# Patient Record
Sex: MALE | Race: OTHER | HISPANIC OR LATINO | URBAN - METROPOLITAN AREA
[De-identification: names, ages, dates, MRNs, and addresses within clinical notes are randomized per-mention and may not be internally consistent; named-entity substitution may affect disease eponyms.]

---

## 2021-03-11 ENCOUNTER — EMERGENCY (EMERGENCY)
Facility: HOSPITAL | Age: 51
LOS: 1 days | Discharge: HOME | End: 2021-03-11
Attending: STUDENT IN AN ORGANIZED HEALTH CARE EDUCATION/TRAINING PROGRAM
Payer: MEDICAID

## 2021-03-11 VITALS
DIASTOLIC BLOOD PRESSURE: 86 MMHG | HEART RATE: 92 BPM | WEIGHT: 179.9 LBS | OXYGEN SATURATION: 97 % | SYSTOLIC BLOOD PRESSURE: 150 MMHG | HEIGHT: 64 IN | RESPIRATION RATE: 18 BRPM | TEMPERATURE: 98 F

## 2021-03-11 LAB
ALBUMIN SERPL ELPH-MCNC: 4 G/DL — SIGNIFICANT CHANGE UP (ref 3.5–5)
ALP SERPL-CCNC: 109 U/L — SIGNIFICANT CHANGE UP (ref 40–120)
ALT FLD-CCNC: 35 U/L DA — SIGNIFICANT CHANGE UP (ref 10–60)
ANION GAP SERPL CALC-SCNC: 5 MMOL/L — SIGNIFICANT CHANGE UP (ref 5–17)
APPEARANCE UR: CLEAR — SIGNIFICANT CHANGE UP
AST SERPL-CCNC: 16 U/L — SIGNIFICANT CHANGE UP (ref 10–40)
BACTERIA # UR AUTO: ABNORMAL /HPF
BASOPHILS # BLD AUTO: 0.05 K/UL — SIGNIFICANT CHANGE UP (ref 0–0.2)
BASOPHILS NFR BLD AUTO: 0.3 % — SIGNIFICANT CHANGE UP (ref 0–2)
BILIRUB SERPL-MCNC: 0.6 MG/DL — SIGNIFICANT CHANGE UP (ref 0.2–1.2)
BILIRUB UR-MCNC: NEGATIVE — SIGNIFICANT CHANGE UP
BUN SERPL-MCNC: 19 MG/DL — HIGH (ref 7–18)
CALCIUM SERPL-MCNC: 9.1 MG/DL — SIGNIFICANT CHANGE UP (ref 8.4–10.5)
CHLORIDE SERPL-SCNC: 108 MMOL/L — SIGNIFICANT CHANGE UP (ref 96–108)
CO2 SERPL-SCNC: 27 MMOL/L — SIGNIFICANT CHANGE UP (ref 22–31)
COLOR SPEC: YELLOW — SIGNIFICANT CHANGE UP
CREAT SERPL-MCNC: 1.04 MG/DL — SIGNIFICANT CHANGE UP (ref 0.5–1.3)
DIFF PNL FLD: ABNORMAL
EOSINOPHIL # BLD AUTO: 0.22 K/UL — SIGNIFICANT CHANGE UP (ref 0–0.5)
EOSINOPHIL NFR BLD AUTO: 1.2 % — SIGNIFICANT CHANGE UP (ref 0–6)
GLUCOSE SERPL-MCNC: 125 MG/DL — HIGH (ref 70–99)
GLUCOSE UR QL: NEGATIVE — SIGNIFICANT CHANGE UP
HCT VFR BLD CALC: 44.4 % — SIGNIFICANT CHANGE UP (ref 39–50)
HGB BLD-MCNC: 14.6 G/DL — SIGNIFICANT CHANGE UP (ref 13–17)
IMM GRANULOCYTES NFR BLD AUTO: 0.6 % — SIGNIFICANT CHANGE UP (ref 0–1.5)
KETONES UR-MCNC: ABNORMAL
LEUKOCYTE ESTERASE UR-ACNC: ABNORMAL
LIDOCAIN IGE QN: 119 U/L — SIGNIFICANT CHANGE UP (ref 73–393)
LYMPHOCYTES # BLD AUTO: 11.5 % — LOW (ref 13–44)
LYMPHOCYTES # BLD AUTO: 2.08 K/UL — SIGNIFICANT CHANGE UP (ref 1–3.3)
MAGNESIUM SERPL-MCNC: 2.1 MG/DL — SIGNIFICANT CHANGE UP (ref 1.6–2.6)
MCHC RBC-ENTMCNC: 29.6 PG — SIGNIFICANT CHANGE UP (ref 27–34)
MCHC RBC-ENTMCNC: 32.9 GM/DL — SIGNIFICANT CHANGE UP (ref 32–36)
MCV RBC AUTO: 89.9 FL — SIGNIFICANT CHANGE UP (ref 80–100)
MONOCYTES # BLD AUTO: 1.24 K/UL — HIGH (ref 0–0.9)
MONOCYTES NFR BLD AUTO: 6.9 % — SIGNIFICANT CHANGE UP (ref 2–14)
NEUTROPHILS # BLD AUTO: 14.34 K/UL — HIGH (ref 1.8–7.4)
NEUTROPHILS NFR BLD AUTO: 79.5 % — HIGH (ref 43–77)
NITRITE UR-MCNC: NEGATIVE — SIGNIFICANT CHANGE UP
NRBC # BLD: 0 /100 WBCS — SIGNIFICANT CHANGE UP (ref 0–0)
PH UR: 5 — SIGNIFICANT CHANGE UP (ref 5–8)
PLATELET # BLD AUTO: 240 K/UL — SIGNIFICANT CHANGE UP (ref 150–400)
POTASSIUM SERPL-MCNC: 4 MMOL/L — SIGNIFICANT CHANGE UP (ref 3.5–5.3)
POTASSIUM SERPL-SCNC: 4 MMOL/L — SIGNIFICANT CHANGE UP (ref 3.5–5.3)
PROT SERPL-MCNC: 7.8 G/DL — SIGNIFICANT CHANGE UP (ref 6–8.3)
PROT UR-MCNC: 15
RBC # BLD: 4.94 M/UL — SIGNIFICANT CHANGE UP (ref 4.2–5.8)
RBC # FLD: 13.1 % — SIGNIFICANT CHANGE UP (ref 10.3–14.5)
RBC CASTS # UR COMP ASSIST: SIGNIFICANT CHANGE UP /HPF (ref 0–2)
SODIUM SERPL-SCNC: 140 MMOL/L — SIGNIFICANT CHANGE UP (ref 135–145)
SP GR SPEC: 1.02 — SIGNIFICANT CHANGE UP (ref 1.01–1.02)
TSH SERPL-MCNC: 0.8 UU/ML — SIGNIFICANT CHANGE UP (ref 0.34–4.82)
UROBILINOGEN FLD QL: NEGATIVE — SIGNIFICANT CHANGE UP
WBC # BLD: 18.03 K/UL — HIGH (ref 3.8–10.5)
WBC # FLD AUTO: 18.03 K/UL — HIGH (ref 3.8–10.5)
WBC UR QL: SIGNIFICANT CHANGE UP /HPF (ref 0–5)

## 2021-03-11 PROCEDURE — 99285 EMERGENCY DEPT VISIT HI MDM: CPT

## 2021-03-11 RX ORDER — KETOROLAC TROMETHAMINE 30 MG/ML
30 SYRINGE (ML) INJECTION ONCE
Refills: 0 | Status: DISCONTINUED | OUTPATIENT
Start: 2021-03-11 | End: 2021-03-11

## 2021-03-11 RX ORDER — CEFEPIME 1 G/1
1000 INJECTION, POWDER, FOR SOLUTION INTRAMUSCULAR; INTRAVENOUS ONCE
Refills: 0 | Status: COMPLETED | OUTPATIENT
Start: 2021-03-11 | End: 2021-03-11

## 2021-03-11 RX ORDER — SODIUM CHLORIDE 9 MG/ML
1000 INJECTION INTRAMUSCULAR; INTRAVENOUS; SUBCUTANEOUS ONCE
Refills: 0 | Status: COMPLETED | OUTPATIENT
Start: 2021-03-11 | End: 2021-03-11

## 2021-03-11 RX ADMIN — Medication 30 MILLIGRAM(S): at 22:30

## 2021-03-11 RX ADMIN — SODIUM CHLORIDE 1000 MILLILITER(S): 9 INJECTION INTRAMUSCULAR; INTRAVENOUS; SUBCUTANEOUS at 22:13

## 2021-03-11 RX ADMIN — Medication 30 MILLIGRAM(S): at 22:13

## 2021-03-11 NOTE — ED PROVIDER NOTE - NSFOLLOWUPINSTRUCTIONS_ED_ALL_ED_FT
Hoy te vieron en la nathalie de emergencias por dolor de estómago.    Llame al consultorio del médico de atención primaria para informarles de esta visita a la nathalie de emergencias y obtener la próxima lashnoda disponible dentro de los próximos 5 días. [place] comentamos, regrese a la nathalie de emergencias si tiene algún síntoma que empeora.    Ya no creemos que necesite más atención de emergencia o admisión al hospital en kat momento.    Si genesis hemos determinado que actualmente está estable para el caitlin, sabemos que las cosas pueden cambiar.   Busque atención médica inmediata o regrese a la nathalie de emergencias si experimenta alguno de los siguientes:   Cualquier síntoma que empeore o persista   Dolor severo Dolor de pecho   Respiración dificultosa   Sangrado   Desmayarse   Erupción severa   Incapacidad para comer o beber   Fiebre persistente      Consulte a un médico de atención primaria o un especialista en un plazo de 5 días para asegurarse de que está mejorando.      Llame a los números de teléfono de [facility] que figuran en kat documento si tiene algún problema para obtener sobia lashonda de seguimiento.    ¡Te deseo lo mejor! -Dr Baca

## 2021-03-11 NOTE — ED PROVIDER NOTE - NSFOLLOWUPCLINICS_GEN_ALL_ED_FT
[facility]  Internal Medicine  [address] [place] [address]  Phone: [phone]  Fax: [phone]  Follow Up Time: 4-6 Days

## 2021-03-11 NOTE — ED PROVIDER NOTE - CLINICAL SUMMARY MEDICAL DECISION MAKING FREE TEXT BOX
50M presenting with abdominal pain. no fever, vomiting, dysuria or hematuria. concern for kidney stone, appendicitis, colitis. will get labs, CT abdomen, symptom control. will reassess. 50M presenting with abdominal pain. no fever, vomiting, dysuria or hematuria. concern for kidney stone, appendicitis, colitis. will get labs, CT abdomen, symptom control. will reassess.    Daniel 0359:  Pt s/o from Dr Fritz pending CT scan. CT wnl. On reassessment pt states that his symptoms and pain have completely resolved and he feels well. Pt needs PMD referral. Discussed with pt my clinical impression and results, patient given strict return precautions if persistent or worsening of symptoms occurs, and need for close follow up. Pt expressed understanding and agrees with plan. Pt is well appearing with a reassuring exam. Discharge home with PMD or Specialist f/u within 5 days.

## 2021-03-11 NOTE — ED PROVIDER NOTE - OBJECTIVE STATEMENT
50M, no significant pmh, presenting with abdominal pain. patient abdominal pain starting yesterday. has right flank pain that is wrapping around to his stomach. has nausea but no vomiting, pain or burning with urination or blood in urine. no chest pain, shortness of breath or fever. no similar symptoms in the past.

## 2021-03-11 NOTE — ED PROVIDER NOTE - PHYSICAL EXAMINATION
General: well appearing male, no acute distress   HEENT: normocephalic, atraumatic   Respiratory: normal work of breathing, lungs clear to auscultation bilaterally   Cardiac: regular rate and rhythm   Abdomen: soft, non-tender, no guarding or rebound, no CVA tenderness to palpation    MSK: no swelling or tenderness of lower extremities, moving all extremities spontaneously   Skin: warm, dry   Neuro: A&Ox3  Psych: appropriate affect

## 2021-03-11 NOTE — ED PROVIDER NOTE - PATIENT PORTAL LINK FT
You can access the FollowMyHealth Patient Portal offered by [facility] by registering at the following website: [email or web address]. By joining [employer]’s FollowMyHealth portal, you will also be able to view your health information using other applications (apps) compatible with our system.

## 2021-03-12 VITALS
RESPIRATION RATE: 18 BRPM | HEART RATE: 90 BPM | OXYGEN SATURATION: 97 % | TEMPERATURE: 98 F | SYSTOLIC BLOOD PRESSURE: 140 MMHG | DIASTOLIC BLOOD PRESSURE: 80 MMHG

## 2021-03-12 LAB
CULTURE RESULTS: SIGNIFICANT CHANGE UP
SPECIMEN SOURCE: SIGNIFICANT CHANGE UP

## 2021-03-12 PROCEDURE — 99284 EMERGENCY DEPT VISIT MOD MDM: CPT | Mod: 25

## 2021-03-12 PROCEDURE — 85025 COMPLETE CBC W/AUTO DIFF WBC: CPT

## 2021-03-12 PROCEDURE — 80053 COMPREHEN METABOLIC PANEL: CPT

## 2021-03-12 PROCEDURE — 36415 COLL VENOUS BLD VENIPUNCTURE: CPT

## 2021-03-12 PROCEDURE — 96375 TX/PRO/DX INJ NEW DRUG ADDON: CPT

## 2021-03-12 PROCEDURE — G1004: CPT

## 2021-03-12 PROCEDURE — 74177 CT ABD & PELVIS W/CONTRAST: CPT | Mod: 26,MG

## 2021-03-12 PROCEDURE — 87086 URINE CULTURE/COLONY COUNT: CPT

## 2021-03-12 PROCEDURE — 81001 URINALYSIS AUTO W/SCOPE: CPT

## 2021-03-12 PROCEDURE — 83690 ASSAY OF LIPASE: CPT

## 2021-03-12 PROCEDURE — 84443 ASSAY THYROID STIM HORMONE: CPT

## 2021-03-12 PROCEDURE — 74177 CT ABD & PELVIS W/CONTRAST: CPT

## 2021-03-12 PROCEDURE — 83735 ASSAY OF MAGNESIUM: CPT

## 2021-03-12 PROCEDURE — 96374 THER/PROPH/DIAG INJ IV PUSH: CPT | Mod: XU

## 2021-03-12 RX ADMIN — CEFEPIME 100 MILLIGRAM(S): 1 INJECTION, POWDER, FOR SOLUTION INTRAMUSCULAR; INTRAVENOUS at 01:06

## 2022-01-22 NOTE — ED ADULT NURSE NOTE - PRIMARY CARE PROVIDER
1. TPN via PICC:  225g Dex, 63g AA, 20g SMOF Lipids to provide in total 1217 Kcal, 63g protein, GIR 3.7mg/kg/min, 1.5g/kg IBW protein.   Recommend checking TG before starting TPN and then check TG weekly. Check Mg, Phos, K daily and POC BG Q6hrs. Trend daily weights. Fluids and lytes per MD discretion. Start at 100g Dex on Day 1, 225g Dex on Day 2. 2. Utilize GI tract as medically feasible 3. Pain and bowel regimens per team 4. Keep nutrition aligned with GOC at all times *dw SICU unk 1. TPN via PICC:  225g Dex, 63g AA, 20g SMOF Lipids to provide in total 1217 Kcal, 63g protein, GIR 3.7mg/kg/min, 1.5g/kg IBW protein.   Recommend checking TG before starting TPN and then check TG weekly. Check Mg, Phos, K daily and POC BG Q6hrs. Trend daily weights. Fluids and lytes per MD discretion. Start at 100g Dex on Day 1, 225g Dex on Day 2. *Monitor lytes very closely for s/s refeeding syndrome* 2. Utilize GI tract as medically feasible 3. Pain and bowel regimens per team 4. Keep nutrition aligned with GOC at all times *subha SICU

## 2022-11-12 ENCOUNTER — EMERGENCY (EMERGENCY)
Facility: HOSPITAL | Age: 52
LOS: 1 days | Discharge: HOME | End: 2022-11-12
Attending: EMERGENCY MEDICINE
Payer: MEDICAID

## 2022-11-12 VITALS
SYSTOLIC BLOOD PRESSURE: 160 MMHG | TEMPERATURE: 98 F | HEART RATE: 67 BPM | RESPIRATION RATE: 18 BRPM | WEIGHT: 184.09 LBS | DIASTOLIC BLOOD PRESSURE: 90 MMHG | OXYGEN SATURATION: 98 %

## 2022-11-12 LAB
ALBUMIN SERPL ELPH-MCNC: 3.9 G/DL — SIGNIFICANT CHANGE UP (ref 3.5–5)
ALP SERPL-CCNC: 97 U/L — SIGNIFICANT CHANGE UP (ref 40–120)
ALT FLD-CCNC: 38 U/L DA — SIGNIFICANT CHANGE UP (ref 10–60)
ANION GAP SERPL CALC-SCNC: 4 MMOL/L — LOW (ref 5–17)
AST SERPL-CCNC: 16 U/L — SIGNIFICANT CHANGE UP (ref 10–40)
BASOPHILS # BLD AUTO: 0.04 K/UL — SIGNIFICANT CHANGE UP (ref 0–0.2)
BASOPHILS NFR BLD AUTO: 0.4 % — SIGNIFICANT CHANGE UP (ref 0–2)
BILIRUB SERPL-MCNC: 0.6 MG/DL — SIGNIFICANT CHANGE UP (ref 0.2–1.2)
BUN SERPL-MCNC: 15 MG/DL — SIGNIFICANT CHANGE UP (ref 7–18)
CALCIUM SERPL-MCNC: 8.8 MG/DL — SIGNIFICANT CHANGE UP (ref 8.4–10.5)
CHLORIDE SERPL-SCNC: 108 MMOL/L — SIGNIFICANT CHANGE UP (ref 96–108)
CO2 SERPL-SCNC: 25 MMOL/L — SIGNIFICANT CHANGE UP (ref 22–31)
CREAT SERPL-MCNC: 1.05 MG/DL — SIGNIFICANT CHANGE UP (ref 0.5–1.3)
EGFR: 86 ML/MIN/1.73M2 — SIGNIFICANT CHANGE UP
EOSINOPHIL # BLD AUTO: 0.14 K/UL — SIGNIFICANT CHANGE UP (ref 0–0.5)
EOSINOPHIL NFR BLD AUTO: 1.6 % — SIGNIFICANT CHANGE UP (ref 0–6)
GLUCOSE SERPL-MCNC: 191 MG/DL — HIGH (ref 70–99)
HCT VFR BLD CALC: 48 % — SIGNIFICANT CHANGE UP (ref 39–50)
HGB BLD-MCNC: 15.5 G/DL — SIGNIFICANT CHANGE UP (ref 13–17)
IMM GRANULOCYTES NFR BLD AUTO: 0.6 % — SIGNIFICANT CHANGE UP (ref 0–0.9)
LIDOCAIN IGE QN: 99 U/L — SIGNIFICANT CHANGE UP (ref 73–393)
LYMPHOCYTES # BLD AUTO: 1.16 K/UL — SIGNIFICANT CHANGE UP (ref 1–3.3)
LYMPHOCYTES # BLD AUTO: 13 % — SIGNIFICANT CHANGE UP (ref 13–44)
MCHC RBC-ENTMCNC: 29.1 PG — SIGNIFICANT CHANGE UP (ref 27–34)
MCHC RBC-ENTMCNC: 32.3 GM/DL — SIGNIFICANT CHANGE UP (ref 32–36)
MCV RBC AUTO: 90.2 FL — SIGNIFICANT CHANGE UP (ref 80–100)
MONOCYTES # BLD AUTO: 0.42 K/UL — SIGNIFICANT CHANGE UP (ref 0–0.9)
MONOCYTES NFR BLD AUTO: 4.7 % — SIGNIFICANT CHANGE UP (ref 2–14)
NEUTROPHILS # BLD AUTO: 7.14 K/UL — SIGNIFICANT CHANGE UP (ref 1.8–7.4)
NEUTROPHILS NFR BLD AUTO: 79.7 % — HIGH (ref 43–77)
NRBC # BLD: 0 /100 WBCS — SIGNIFICANT CHANGE UP (ref 0–0)
PLATELET # BLD AUTO: 206 K/UL — SIGNIFICANT CHANGE UP (ref 150–400)
POTASSIUM SERPL-MCNC: 4.2 MMOL/L — SIGNIFICANT CHANGE UP (ref 3.5–5.3)
POTASSIUM SERPL-SCNC: 4.2 MMOL/L — SIGNIFICANT CHANGE UP (ref 3.5–5.3)
PROT SERPL-MCNC: 7.5 G/DL — SIGNIFICANT CHANGE UP (ref 6–8.3)
RBC # BLD: 5.32 M/UL — SIGNIFICANT CHANGE UP (ref 4.2–5.8)
RBC # FLD: 13 % — SIGNIFICANT CHANGE UP (ref 10.3–14.5)
SODIUM SERPL-SCNC: 137 MMOL/L — SIGNIFICANT CHANGE UP (ref 135–145)
TROPONIN I, HIGH SENSITIVITY RESULT: 6.8 NG/L — SIGNIFICANT CHANGE UP
WBC # BLD: 8.95 K/UL — SIGNIFICANT CHANGE UP (ref 3.8–10.5)
WBC # FLD AUTO: 8.95 K/UL — SIGNIFICANT CHANGE UP (ref 3.8–10.5)

## 2022-11-12 PROCEDURE — 99285 EMERGENCY DEPT VISIT HI MDM: CPT

## 2022-11-12 PROCEDURE — 83690 ASSAY OF LIPASE: CPT

## 2022-11-12 PROCEDURE — 36415 COLL VENOUS BLD VENIPUNCTURE: CPT

## 2022-11-12 PROCEDURE — 84484 ASSAY OF TROPONIN QUANT: CPT

## 2022-11-12 PROCEDURE — 80053 COMPREHEN METABOLIC PANEL: CPT

## 2022-11-12 PROCEDURE — 99284 EMERGENCY DEPT VISIT MOD MDM: CPT | Mod: 25

## 2022-11-12 PROCEDURE — 96375 TX/PRO/DX INJ NEW DRUG ADDON: CPT

## 2022-11-12 PROCEDURE — 96374 THER/PROPH/DIAG INJ IV PUSH: CPT

## 2022-11-12 PROCEDURE — 85025 COMPLETE CBC W/AUTO DIFF WBC: CPT

## 2022-11-12 RX ORDER — ONDANSETRON 8 MG/1
4 TABLET, FILM COATED ORAL ONCE
Refills: 0 | Status: COMPLETED | OUTPATIENT
Start: 2022-11-12 | End: 2022-11-12

## 2022-11-12 RX ORDER — MORPHINE SULFATE 50 MG/1
4 CAPSULE, EXTENDED RELEASE ORAL ONCE
Refills: 0 | Status: DISCONTINUED | OUTPATIENT
Start: 2022-11-12 | End: 2022-11-12

## 2022-11-12 RX ORDER — FAMOTIDINE 10 MG/ML
20 INJECTION INTRAVENOUS ONCE
Refills: 0 | Status: COMPLETED | OUTPATIENT
Start: 2022-11-12 | End: 2022-11-12

## 2022-11-12 RX ORDER — LIDOCAINE 4 G/100G
10 CREAM TOPICAL ONCE
Refills: 0 | Status: COMPLETED | OUTPATIENT
Start: 2022-11-12 | End: 2022-11-12

## 2022-11-12 RX ORDER — SODIUM CHLORIDE 9 MG/ML
1000 INJECTION INTRAMUSCULAR; INTRAVENOUS; SUBCUTANEOUS ONCE
Refills: 0 | Status: COMPLETED | OUTPATIENT
Start: 2022-11-12 | End: 2022-11-12

## 2022-11-12 RX ORDER — FAMOTIDINE 10 MG/ML
1 INJECTION INTRAVENOUS
Qty: 28 | Refills: 0
Start: 2022-11-12 | End: 2022-11-25

## 2022-11-12 RX ADMIN — FAMOTIDINE 20 MILLIGRAM(S): 10 INJECTION INTRAVENOUS at 06:25

## 2022-11-12 RX ADMIN — SODIUM CHLORIDE 1000 MILLILITER(S): 9 INJECTION INTRAMUSCULAR; INTRAVENOUS; SUBCUTANEOUS at 06:25

## 2022-11-12 RX ADMIN — ONDANSETRON 4 MILLIGRAM(S): 8 TABLET, FILM COATED ORAL at 06:25

## 2022-11-12 RX ADMIN — Medication 30 MILLILITER(S): at 06:25

## 2022-11-12 RX ADMIN — MORPHINE SULFATE 4 MILLIGRAM(S): 50 CAPSULE, EXTENDED RELEASE ORAL at 06:25

## 2022-11-12 RX ADMIN — LIDOCAINE 10 MILLILITER(S): 4 CREAM TOPICAL at 06:25

## 2022-11-12 NOTE — ED PROVIDER NOTE - NSFOLLOWUPINSTRUCTIONS_ED_ALL_ED_FT
[facility]                                                                                                                                 Gastritis en adultos    Gastritis, Adult    Outline of an adult's lower body with a close-up of the stomach, showing inflammation and an ulcer inside the stomach.    La gastritis es la inflamación del estómago. Hay dos tipos de gastritis:  •Gastritis aguda. Merry tipo aparece de manera repentina.      •Gastritis crónica. Merry tipo es mucho más frecuente. Se desarrolla lentamente y dura un nichole tiempo.      La gastritis se manifiesta cuando el revestimiento del estómago se debilita o se lesiona. Sin tratamiento, la gastritis puede causar sangrado y úlceras estomacales.      ¿Cuáles son las causas?    Esta afección puede ser causada por lo siguiente:  •Sobia infección.      •Beber alcohol en exceso.      •Ciertos medicamentos. Estos incluyen corticoesteroides, antibióticos y algunos medicamentos de venta sin receta, violette aspirina o ibuprofeno.      •Tener demasiado ácido en el estómago.      •Tener sobia enfermedad del estómago.      Algunas otras causas son las siguientes:  •Sobia reacción alérgica.      •Algunos tratamientos para el cáncer (radiación).      •Fumar cigarrillos o usar productos que contengan nicotina o tabaco.      En algunos casos, se desconoce la causa de esta afección.      ¿Qué incrementa el riesgo?    •Tener sobia enfermedad de los intestinos.      •Tener sobia enfermedad por la cual el propio sistema inmunitario ataca el organismo (enfermedad autoinmunitaria), violette la enfermedad de Crohn.      •Usar aspirina o ibuprofeno y otros antiinflamatorios no esteroideos (JAN) para tratar otras afecciones, violette enfermedades cardíacas o dolor crónico.      •Estrés.        ¿Cuáles son los signos o síntomas?    Los síntomas de esta afección incluyen los siguientes:  •Dolor o sensación de ardor en la parte superior del abdomen.      •Náuseas.      •Vómitos.      •Sensación molesta de saciedad después de comer.      •Pérdida de peso.      •Mal aliento.      •Sony en el vómito o en la materia fecal (heces).      En algunos casos, no hay síntomas.      ¿Cómo se diagnostica?    Esta afección se puede diagnosticar en función de patricia antecedentes médicos, un examen físico y pruebas. Las pruebas pueden incluir las siguientes:  •Patricia antecedentes médicos y sobia descripción de patricia síntomas.      •Un examen físico.    •Pruebas. Estas pueden incluir las siguientes:  •Pruebas de sony.      •Pruebas de heces.      •Sobia prueba en la que se introduce un instrumento garcia y flexible con sobia priscilla y sobia pequeña cámara a través del esófago hasta el estómago (endoscopía catilin).      •Sobia prueba en la que se jose sobia muestra de tejido para analizarla con un microscopio (biopsia).          ¿Cómo se trata?    Esta afección se puede tratar con medicamentos. Los medicamentos que se utilizan varían según la causa de la gastritis.  •Si la afección se debe a sobia infección bacteriana, pueden recetarle medicamentos antibióticos.      •Si la afección se debe al exceso de ácido estomacal, se pueden administrar medicamentos denominados bloqueadores H2, inhibidores de la bomba de protones o antiácidos.      El tratamiento también puede incluir interrumpir el uso de ciertos medicamentos violette aspirina o ibuprofeno y otros antiinflamatorios no esteroideos (JAN).      Siga estas instrucciones en claudio casa:    Medicamentos     •Use los medicamentos de venta elen y los recetados solamente violette se lo haya indicado el médico.      •Si le recetaron un antibiótico, tómelo violette se lo haya indicado el médico. No deje de ventura el antibiótico aunque comience a sentirse mejor.      Consumo de alcohol   • No ysabel alcohol si:  •Claudio médico le indica no hacerlo.      •Está embarazada, puede estar embarazada o está tratando de quedar embarazada.      •Si savannah alcohol:•Limite claudio uso a las siguientes medidas:  •De 0 a 1 medida por día para las mujeres.      •De 0 a 2 medidas por día para los hombres.        •Sepa cuánta cantidad de alcohol hay en las bebidas que jose. En los [place], sobia medida equivale a sobia botella de cerveza de 12 oz (355 ml), un vaso de vino de 5 oz (148 ml) o un vaso de sobia bebida alcohólica de caitlin graduación de 1½ oz (44 ml).          Instrucciones generales   A comparison of three sample cups showing dark yellow, yellow, and pale yellow urine.   •Moses comidas pequeñas y frecuentes mica el día en lugar de comidas abundantes.      •Evite los alimentos y las bebidas que intensifican los síntomas.      •Hable con el médico sobre las formas de controlar el estrés, violette realizar ejercicio con regularidad o practicar respiración profunda, meditación o yoga.      • No consuma ningún producto que contenga nicotina o tabaco. Estos productos incluyen cigarrillos, tabaco para mascar y aparatos de vapeo, violette los cigarrillos electrónicos. Si necesita ayuda para dejar de fumar, consulte al médico.      •Ysabel suficiente líquido violette para mantener la orina de color amarillo pálido.      •Concurra a todas las visitas de seguimiento. [place] es importante.        Comuníquese con un médico si:    •Patricia síntomas empeoran.      •El dolor abdominal empeora.      •Los síntomas regresan después del tratamiento.      •Tiene fiebre.        Solicite ayuda de inmediato si:    •Vomita sony o sobia sustancia parecida a los posos del café.      •Las heces son negras o de color whatley oscuro.      •No puede retener los líquidos.      Estos síntomas pueden representar un problema grave que constituye sobia emergencia. No espere a michael si los síntomas desaparecen. Solicite atención médica de inmediato. Comuníquese con el servicio de emergencias de claudio localidad (911 en los [place]). No conduzca por patricia propios medios hasta el hospital.       Resumen    •La gastritis es la inflamación del revestimiento del estómago que puede ocurrir de manera repentina (aguda) o desarrollarse lentamente con el tiempo (crónica).      •Esta afección se diagnostica mediante los antecedentes médicos, un examen físico o pruebas.      •Esta afección puede tratarse con medicamentos para tratar la infección o medicamentos para reducir la cantidad de ácido en el estómago.      •Siga las instrucciones del médico acerca de ventura medicamentos, hacer cambios en la dieta y saber cuándo pedir ayuda.      Esta información no tiene violette fin reemplazar el consejo del médico. Asegúrese de hacerle al médico cualquier pregunta que tenga.      Document Revised: 05/19/2022 Document Reviewed: 05/19/2022    Elsevier Patient Education © 2022 [employer]

## 2022-11-12 NOTE — ED PROVIDER NOTE - OBJECTIVE STATEMENT
52 year old male denies PMH coming in with upper abdominal pain with associated nausea and 1 episode of nbnb vomiting. pain is nonradiating. never had pain like this before. denies cp, sob, palpitations, bad pains, diarrhea, constipation, urinary complaints, ha, dizziness, fevers, chills, sweats. s/p appendectomy. states last night he did eat out at a restaurant and had meat and cicerones.

## 2022-11-12 NOTE — ED PROVIDER NOTE - PATIENT PORTAL LINK FT
You can access the FollowMyHealth Patient Portal offered by [facility] by registering at the following website: [email or web address]. By joining [employer]’s FollowMyHealth portal, you will also be able to view your health information using other applications (apps) compatible with our system.

## 2022-11-12 NOTE — ED PROVIDER NOTE - PROGRESS NOTE DETAILS
pain resolved. abdomen nontender. labs are unremarkable. likely gastritis 2/2 food he ate last night. rx for meds. f/u with PMD. return precautions discussed.
